# Patient Record
Sex: MALE | URBAN - METROPOLITAN AREA
[De-identification: names, ages, dates, MRNs, and addresses within clinical notes are randomized per-mention and may not be internally consistent; named-entity substitution may affect disease eponyms.]

---

## 2021-04-21 ENCOUNTER — OFFICE VISIT (OUTPATIENT)
Dept: URBAN - METROPOLITAN AREA CLINIC 16 | Facility: CLINIC | Age: 71
End: 2021-04-21
Payer: OTHER MISCELLANEOUS

## 2021-04-21 DIAGNOSIS — H25.13 AGE-RELATED NUCLEAR CATARACT, BILATERAL: Primary | ICD-10-CM

## 2021-04-21 PROCEDURE — 99204 OFFICE O/P NEW MOD 45 MIN: CPT | Performed by: OPTOMETRIST

## 2021-04-21 ASSESSMENT — INTRAOCULAR PRESSURE
OS: 20
OD: 20

## 2021-04-21 ASSESSMENT — KERATOMETRY
OS: 43.63
OD: 43.88

## 2021-04-21 NOTE — IMPRESSION/PLAN
Impression: Atypical facial pain: G50.1. with normal ocular health.  Recent sed rate/CRP  and carotid dopler with normal results Plan: RTC for VF (30-2)

## 2021-04-26 ENCOUNTER — TESTING ONLY (OUTPATIENT)
Dept: URBAN - METROPOLITAN AREA CLINIC 16 | Facility: CLINIC | Age: 71
End: 2021-04-26
Payer: OTHER MISCELLANEOUS

## 2021-04-26 DIAGNOSIS — G50.1 ATYPICAL FACIAL PAIN: Primary | ICD-10-CM

## 2021-04-26 PROCEDURE — 92083 EXTENDED VISUAL FIELD XM: CPT | Performed by: OPTOMETRIST
